# Patient Record
Sex: MALE
[De-identification: names, ages, dates, MRNs, and addresses within clinical notes are randomized per-mention and may not be internally consistent; named-entity substitution may affect disease eponyms.]

---

## 2023-06-13 ENCOUNTER — APPOINTMENT (OUTPATIENT)
Dept: OTOLARYNGOLOGY | Facility: CLINIC | Age: 28
End: 2023-06-13
Payer: COMMERCIAL

## 2023-06-13 VITALS
TEMPERATURE: 97 F | HEIGHT: 70 IN | DIASTOLIC BLOOD PRESSURE: 69 MMHG | WEIGHT: 160 LBS | SYSTOLIC BLOOD PRESSURE: 105 MMHG | BODY MASS INDEX: 22.9 KG/M2 | HEART RATE: 75 BPM

## 2023-06-13 DIAGNOSIS — Z78.9 OTHER SPECIFIED HEALTH STATUS: ICD-10-CM

## 2023-06-13 DIAGNOSIS — K21.9 GASTRO-ESOPHAGEAL REFLUX DISEASE W/OUT ESOPHAGITIS: ICD-10-CM

## 2023-06-13 DIAGNOSIS — R09.89 OTHER SPECIFIED SYMPTOMS AND SIGNS INVOLVING THE CIRCULATORY AND RESPIRATORY SYSTEMS: ICD-10-CM

## 2023-06-13 DIAGNOSIS — F12.90 CANNABIS USE, UNSPECIFIED, UNCOMPLICATED: ICD-10-CM

## 2023-06-13 DIAGNOSIS — Z82.49 FAMILY HISTORY OF ISCHEMIC HEART DISEASE AND OTHER DISEASES OF THE CIRCULATORY SYSTEM: ICD-10-CM

## 2023-06-13 PROBLEM — Z00.00 ENCOUNTER FOR PREVENTIVE HEALTH EXAMINATION: Status: ACTIVE | Noted: 2023-06-13

## 2023-06-13 PROCEDURE — 99204 OFFICE O/P NEW MOD 45 MIN: CPT | Mod: 25

## 2023-06-13 PROCEDURE — 31575 DIAGNOSTIC LARYNGOSCOPY: CPT

## 2023-06-13 NOTE — HISTORY OF PRESENT ILLNESS
[de-identified] : 6/12/23\par 28M presents with throat clearing for 10 years. Worse after eating, specifically spicy foods and garlic. Feels like he has an excess of mucus in his throat. Symptoms occur every day. Associated with mild voice hoarseness. Denies difficulty chewing, eating or swallowing. No breathing issues. No hx of acid reflux. No treatments. \par \par Diet:\par + 1 cup Caffeine, mint, tomato, citrus, garlic, onion, blueberries, spicy foods, carbonated beverages every day\par - chocolate\par Glasses of water per day: 4-5\par Late night eating: yes \par Alcohol use, especially at night: no\par \par

## 2023-06-13 NOTE — ASSESSMENT
[FreeTextEntry1] : 28 year old male presents with concern for throat clearing for 10 years. Exam today was normal. Based on history and physical exam, I do believe there is a component of laryngopharyngeal reflux. At this time I am recommending dietary and behavioral change to reduce acid in the diet. I am also recommending omeprazole as well famotidine for a 3 months trial. \par \par Plan: \par - Dietary and behavioral modification to reduce acid reflux, handout given \par - Recommended Gaviscon alginate, reflux gourmet and reflux raft \par - Voice hygiene, increase hydration, sips of water throughout the day, avoid throat clearing\par - fu 3 months for repeat eval, if symptoms persist will start Omeprazole & Famotidine \par

## 2023-06-13 NOTE — PROCEDURE
Attempted to call [facility] Bariatric department to get information to send referral with no answer or voicemail. Will try again        ----- Message from Yesenia Baldwin RN sent at 10/4/2022  2:21 PM CDT -----  Dr Lamas wants pt to be referred to [facility] for bariatric surgery and weight loss program, can you do this?    
[de-identified] : -\par Laryngoscopy: Flexible Laryngoscopy - Procedure Note\par \par Pre-operative Diagnosis: throat clearing \par Post-operative Diagnosis: normal exam\par Anesthesia: Topical - 1 % Lidocaine/Phenylephrine\par Procedure: Flexible Laryngoscopy\par \par Procedure Details: \par The patient was placed in the sitting position. After decongestant and anesthesia were applied the laryngoscope was passed. The nasal cavities, nasopharynx, oropharynx, hypopharynx, and larynx were all examined. Vocal folds were examined during respiration and phonation. The following findings were noted:\par \par Findings: \par Nose: Septum is midline, turbinates are normal, nasal airways patent, mucosa normal\par Nasopharynx: Adenoids normal, no masses, eustachian tube normal\par Oropharynx: Pharyngeal walls symmetric and without lesion. Tonsils/fossae symmetric and normal.\par Hypopharynx: Hypopharynx and pyriform sinuses without lesion. No masses or asymmetry. No pooling of secretions.\par Larynx: Epiglottis and aryepiglottic folds were sharp and crisp bilaterally. Bilateral false and true vocal folds normal appearance. Bilateral vocal folds fully mobile and symmetric. Airway was widely patent. \par \par Condition: Stable. Patient tolerated procedure well.\par \par Complications: None\par \par

## 2023-06-13 NOTE — PHYSICAL EXAM
[Midline] : trachea located in midline position [Laryngoscopy Performed] : laryngoscopy was performed, see procedure section for findings [Normal] : no rashes [] : septum deviated to the left

## 2023-08-25 ENCOUNTER — APPOINTMENT (OUTPATIENT)
Dept: OTOLARYNGOLOGY | Facility: CLINIC | Age: 28
End: 2023-08-25